# Patient Record
Sex: FEMALE | Race: WHITE | URBAN - METROPOLITAN AREA
[De-identification: names, ages, dates, MRNs, and addresses within clinical notes are randomized per-mention and may not be internally consistent; named-entity substitution may affect disease eponyms.]

---

## 2021-04-30 ENCOUNTER — OFFICE VISIT (OUTPATIENT)
Dept: ORTHOPEDIC SURGERY | Age: 56
End: 2021-04-30
Payer: COMMERCIAL

## 2021-04-30 VITALS — BODY MASS INDEX: 37.56 KG/M2 | HEIGHT: 64 IN | WEIGHT: 220 LBS

## 2021-04-30 DIAGNOSIS — M70.62 TROCHANTERIC BURSITIS OF LEFT HIP: ICD-10-CM

## 2021-04-30 DIAGNOSIS — M25.511 RIGHT SHOULDER PAIN, UNSPECIFIED CHRONICITY: ICD-10-CM

## 2021-04-30 DIAGNOSIS — M25.552 LEFT HIP PAIN: Primary | ICD-10-CM

## 2021-04-30 DIAGNOSIS — M75.51 BURSITIS OF RIGHT SHOULDER: ICD-10-CM

## 2021-04-30 PROCEDURE — 20611 DRAIN/INJ JOINT/BURSA W/US: CPT | Performed by: ORTHOPAEDIC SURGERY

## 2021-04-30 PROCEDURE — 99214 OFFICE O/P EST MOD 30 MIN: CPT | Performed by: ORTHOPAEDIC SURGERY

## 2021-04-30 RX ORDER — DESLORATADINE 5 MG/1
TABLET ORAL
COMMUNITY

## 2021-04-30 RX ORDER — DULOXETIN HYDROCHLORIDE 30 MG/1
30 CAPSULE, DELAYED RELEASE ORAL DAILY
COMMUNITY
Start: 2021-03-25

## 2021-04-30 RX ORDER — FLUTICASONE PROPIONATE 50 MCG
SPRAY, SUSPENSION (ML) NASAL
COMMUNITY

## 2021-04-30 RX ORDER — CETIRIZINE HCL 10 MG
10 TABLET ORAL DAILY
COMMUNITY

## 2021-04-30 RX ORDER — RIZATRIPTAN BENZOATE 10 MG/1
TABLET ORAL
COMMUNITY

## 2021-04-30 RX ORDER — LANOLIN ALCOHOL/MO/W.PET/CERES
3 CREAM (GRAM) TOPICAL
COMMUNITY

## 2021-04-30 RX ORDER — OXCARBAZEPINE 300 MG/1
300 TABLET, FILM COATED ORAL 2 TIMES DAILY
COMMUNITY

## 2021-04-30 RX ORDER — MOMETASONE FUROATE 50 UG/1
2 SPRAY, METERED NASAL DAILY
COMMUNITY

## 2021-04-30 RX ORDER — ZONISAMIDE 100 MG/1
CAPSULE ORAL
COMMUNITY
Start: 2020-12-02

## 2021-04-30 RX ORDER — MONTELUKAST SODIUM 10 MG/1
10 TABLET ORAL
COMMUNITY

## 2021-04-30 RX ORDER — LIDOCAINE HYDROCHLORIDE 10 MG/ML
9 INJECTION INFILTRATION; PERINEURAL ONCE
Status: COMPLETED | OUTPATIENT
Start: 2021-04-30 | End: 2021-04-30

## 2021-04-30 RX ORDER — DULOXETIN HYDROCHLORIDE 60 MG/1
60 CAPSULE, DELAYED RELEASE ORAL DAILY
COMMUNITY
Start: 2021-03-25 | End: 2022-03-25

## 2021-04-30 RX ORDER — ZOLPIDEM TARTRATE 5 MG/1
TABLET ORAL
COMMUNITY

## 2021-04-30 RX ORDER — ATORVASTATIN CALCIUM 20 MG/1
20 TABLET, FILM COATED ORAL DAILY
COMMUNITY

## 2021-04-30 RX ORDER — TRIAMCINOLONE ACETONIDE 40 MG/ML
40 INJECTION, SUSPENSION INTRA-ARTICULAR; INTRAMUSCULAR ONCE
Status: COMPLETED | OUTPATIENT
Start: 2021-04-30 | End: 2021-04-30

## 2021-04-30 RX ORDER — PROPRANOLOL HYDROCHLORIDE 160 MG/1
160 CAPSULE, EXTENDED RELEASE ORAL DAILY
COMMUNITY
Start: 2021-03-25

## 2021-04-30 RX ORDER — GALCANEZUMAB 120 MG/ML
120 INJECTION, SOLUTION SUBCUTANEOUS
COMMUNITY
Start: 2021-03-25

## 2021-04-30 RX ADMIN — TRIAMCINOLONE ACETONIDE 40 MG: 40 INJECTION, SUSPENSION INTRA-ARTICULAR; INTRAMUSCULAR at 15:19

## 2021-04-30 RX ADMIN — TRIAMCINOLONE ACETONIDE 40 MG: 40 INJECTION, SUSPENSION INTRA-ARTICULAR; INTRAMUSCULAR at 15:21

## 2021-04-30 RX ADMIN — LIDOCAINE HYDROCHLORIDE 9 ML: 10 INJECTION INFILTRATION; PERINEURAL at 15:21

## 2021-04-30 RX ADMIN — LIDOCAINE HYDROCHLORIDE 9 ML: 10 INJECTION INFILTRATION; PERINEURAL at 15:18

## 2021-04-30 NOTE — PATIENT INSTRUCTIONS
Hip Pain: Care Instructions Your Care Instructions Hip pain may be caused by many things, including overuse, a fall, or a twisting movement. Another cause of hip pain is arthritis. Your pain may increase when you stand up, walk, or squat. The pain may come and go or may be constant. Home treatment can help relieve hip pain, swelling, and stiffness. If your pain is ongoing, you may need more tests and treatment. Follow-up care is a key part of your treatment and safety. Be sure to make and go to all appointments, and call your doctor if you are having problems. It's also a good idea to know your test results and keep a list of the medicines you take. How can you care for yourself at home? · Take pain medicines exactly as directed. ? If the doctor gave you a prescription medicine for pain, take it as prescribed. ? If you are not taking a prescription pain medicine, ask your doctor if you can take an over-the-counter medicine. · Rest and protect your hip. Take a break from any activity, including standing or walking, that may cause pain. · Put ice or a cold pack against your hip for 10 to 20 minutes at a time. Try to do this every 1 to 2 hours for the next 3 days (when you are awake) or until the swelling goes down. Put a thin cloth between the ice and your skin. · Sleep on your healthy side with a pillow between your knees, or sleep on your back with pillows under your knees. · If there is no swelling, you can put moist heat, a heating pad, or a warm cloth on your hip. Do gentle stretching exercises to help keep your hip flexible. · Learn how to prevent falls. Have your vision and hearing checked regularly. Wear slippers or shoes with a nonskid sole. · Stay at a healthy weight. · Wear comfortable shoes. When should you call for help? Call 911 anytime you think you may need emergency care.  For example, call if: 
  · You have sudden chest pain and shortness of breath, or you cough up blood.  
  · You are not able to stand or walk or bear weight.  
  · Your buttocks, legs, or feet feel numb or tingly.  
  · Your leg or foot is cool or pale or changes color.  
  · You have severe pain. Call your doctor now or seek immediate medical care if: 
  · You have signs of infection, such as: 
? Increased pain, swelling, warmth, or redness in the hip area. ? Red streaks leading from the hip area. ? Pus draining from the hip area. ? A fever.  
  · You have signs of a blood clot, such as: 
? Pain in your calf, back of the knee, thigh, or groin. ? Redness and swelling in your leg or groin.  
  · You are not able to bend, straighten, or move your leg normally.  
  · You have trouble urinating or having bowel movements. Watch closely for changes in your health, and be sure to contact your doctor if: 
  · You do not get better as expected. Where can you learn more? Go to http://www.gray.com/ Enter T675 in the search box to learn more about \"Hip Pain: Care Instructions. \" Current as of: February 26, 2020               Content Version: 12.8 © 8482-9061 [employer]. Care instructions adapted under license by [employer] (which disclaims liability or warranty for this information). If you have questions about a medical condition or this instruction, always ask your healthcare professional. [place] [address] any warranty or liability for your use of this information.

## 2021-04-30 NOTE — LETTER
Scott Hagen  
1965  
[id number]  
 
 
4/30/2021 I hereby authorize and direct Jim Long MD, Debbi Shepherd, and whomever he may designate as his associate to perform upon myself the following procedure: 
 
Injection of: Kenalog, Supartz, Euflexxa, Orthovisc in the Right/Left ____________________. If any unforeseen condition arises in the course of the procedure, I further authorize him and his associated and/or assistant(s) to do whatever he/she deems advisable. The nature, purpose, benefits, risks, side effects, likelihood of achieving goals, and potential problems that might occur during recuperation, risks for not receiving the proposed care, treatment and services and alternatives of the procedure have been fully explained to me by my physician including, but not limited to: 
 
Swelling, joint pain, skin pigment changes, worsening of condition, and failure to improve. I acknowledge that no guarantee or assurance has been made to me as to the results that may be obtained or the likelihood of success. _______________________________________ Signature of patient or authorized representative [employer] [facility] fax: [phone]

## 2021-04-30 NOTE — PROGRESS NOTES
Name: Izaiah Sepulveda    : 1965     Service Dept: [address] [facility]    Patient's Pharmacies:    [place], [place] - [address]  [address]  [address] [facility] [address]  Phone: [phone] Fax: [phone]       Chief Complaint   Patient presents with    Shoulder Pain    Hip Pain        Visit Vitals  Ht 5' 4\" (1.626 m)   Wt 220 lb (99.8 kg)   BMI 37.76 kg/m²      Allergies   Allergen Reactions    Cat Hair Standardized Allergenic Extract Other (comments)     Other reaction(s): Stuffy, runny nose    Mite Extract Other (comments)     Other reaction(s): Runny, stuffy nose    Codeine Other (comments)      Current Outpatient Medications   Medication Sig Dispense Refill    atorvastatin (LIPITOR) 20 mg tablet Take 20 mg by mouth daily.  cetirizine (ZYRTEC) 10 mg tablet Take 10 mg by mouth daily.  desloratadine (CLARINEX) 5 mg tablet desloratadine 5 mg tablet      DULoxetine (CYMBALTA) 30 mg capsule Take 30 mg by mouth daily.  DULoxetine (CYMBALTA) 60 mg capsule Take 60 mg by mouth daily.  fluticasone propionate (FLONASE) 50 mcg/actuation nasal spray fluticasone propionate 50 mcg/actuation nasal spray,suspension      galcanezumab-gnlm (Emgality Syringe) 120 mg/mL syrg 120 mg by SubCUTAneous route.  melatonin 3 mg tablet Take 3 mg by mouth nightly as needed.  mometasone (NASONEX) 50 mcg/actuation nasal spray 2 Sprays by Nasal route daily.  montelukast (SINGULAIR) 10 mg tablet Take 10 mg by mouth nightly.  OXcarbazepine (TRILEPTAL) 300 mg tablet Take 300 mg by mouth two (2) times a day.  propranolol LA (INDERAL LA) 160 mg capsule Take 160 mg by mouth daily.       zolpidem (AMBIEN) 5 mg tablet zolpidem 5 mg tablet      rizatriptan (MAXALT) 10 mg tablet rizatriptan 10 mg tablet      zonisamide (ZONEGRAN) 100 mg capsule Take 3 capsules by mouth at bedtime        There is no problem list on file for this patient. Family History   Problem Relation Age of Onset    Heart Disease Father       Social History     Socioeconomic History    Marital status:      Spouse name: Not on file    Number of children: Not on file    Years of education: Not on file    Highest education level: Not on file   Tobacco Use    Smoking status: Never Smoker    Smokeless tobacco: Never Used   Substance and Sexual Activity    Alcohol use: Yes      Past Surgical History:   Procedure Laterality Date    HX WISDOM TEETH EXTRACTION        Past Medical History:   Diagnosis Date    High cholesterol     Hypertension         I have reviewed and agree with [address] [place] [address] Nw and ROS and intake form in chart and the record furthermore I have reviewed prior medical record(s) regarding this patients care during this appointment. Review of Systems:   Patient is a pleasant appearing individual, appropriately dressed, well hydrated, well nourished, who is alert, appropriately oriented for age, and in no acute distress with a normal gait and normal affect who does not appear to be in any significant pain. Physical Exam:  Left Hip - Slight decrease range of motion, No crepitation, Grossly neurovascularly intact, Good cap refill, No skin lesion, mild swelling, No gross instability, Some weakness, No groin pain, Point tenderness on the greater trochanter. Right Hip - Normal range of motion, No crepitation, Grossly neurovascularly intact, Good cap refill, No skin lesion, mild swelling, No gross instability, No weakness, No groin pain, No point tenderness on the greater trochanter. Procedure Documentation:    I discussed in detail the risks, benefits and complications of an injection which included but are not limited to infection, skin reactions, hot swollen joint, and anaphylaxis with the patient. The patient verbalized understanding and gave informed consent for the injection.  The patient's left hip was prepped using sterile alcohol solution. A sterile needle was inserted into the left hip and the mixture of 9 mL Lidocaine 1%, 1 mL Kenalog 40 mg was injected under sterile technique. The needle was withdrawn and the puncture site sealed with a Band-Aid. Technique: Under sterile conditions a sofatutor ultrasound unit with a variable frequency (7.0-14.0 MHz) linear transducer was used to localize the placement of needle into the left hip joint. Findings: Successful needle placement for hip injection. Final images were taken and saved for permanent record. The patient tolerated the injection well. The patient was instructed to call the office immediately if there is any pain, redness, warmth, fever, or chills. Right Shoulder - Grossly neurovascularly intact. Range of motion-Full passive, Active with impingement. No Point tenderness, Strength-weakness with abduction, some mild crepitation, No skin lesion are identified, No instabilty is noted, No apprehension. No Swelling. Left Shoulder - Grossly neurovascularly intact, Full Range of motion, No point tenderness, No weakness, No skin lesions, No Instability, No apprehension, No swelling. Procedure Documentation:    I discussed in detail the risks, benefits and complications of an injection which included but are not limited to infection, skin reactions, hot swollen joint, and anaphylaxis with the patient. The patient verbalized understanding and gave informed consent for the injection. The patient's right shoulder were prepped using sterile alcohol solution. A sterile needle was inserted into the right shoulder and the mixture of 9 mL Lidocaine 1%, 1 mL Kenalog 40 mg was injected under sterile technique. The needle was withdrawn and the puncture site sealed with a Band-Aid. Technique: Under sterile conditions a sofatutor ultrasound unit with a variable frequency (7.0-14.0 MHz) linear transducer was used to localize the placement of needle into the right joint.     Findings: Successful needle placement for shoulder injection. Final images were taken and saved for permanent record. The patient tolerated the injection well. The patient was instructed to call the office immediately if there is any pain, redness, warmth, fever, or chills. Encounter Diagnoses     ICD-10-CM ICD-9-CM   1. Left hip pain  M25.552 719.45   2. Trochanteric bursitis of left hip  M70.62 726.5       HPI:  The patient is here with a chief complaint of left hip pain, right shoulder pain, dull throbbing pain, progressively getting worse. Pain is 5/10. ROS:  10-point review of systems is positive for nighttime pain, night sweats. X-rays of the right shoulder and left hip are unremarkable. Assessment/Plan:  1. Left hip bursitis and right shoulder bursitis. Plan will be for cortisone injection for both. We will see the patient back for routine follow-up in one week to see how she is doing and go from there. If no better we will consider physical therapy. As part of continued conservative pain management options the patient was advised to utilize Tylenol or OTC NSAIDS as long as it is not medically contraindicated. Return to Office: Follow-up and Dispositions    · Return in about 1 week (around 5/7/2021). Administrations This Visit     lidocaine (XYLOCAINE) 10 mg/mL (1 %) injection 9 mL     Admin Date  04/30/2021 Action  Given Dose  9 mL Route  Other Administered By  Nelda Early LPN          triamcinolone acetonide (KENALOG-40) 40 mg/mL injection 40 mg     Admin Date  04/30/2021 Action  Given Dose  40 mg Route  Intra artICUlar Administered By  Nelda Early LPN               Scribed by Iesha Meyers LPN as dictated by [facility] JANINE Varela MD.  Documentation True and Accepted Jim Varela MD

## 2021-05-07 ENCOUNTER — OFFICE VISIT (OUTPATIENT)
Dept: ORTHOPEDIC SURGERY | Age: 56
End: 2021-05-07
Payer: COMMERCIAL

## 2021-05-07 DIAGNOSIS — M70.62 TROCHANTERIC BURSITIS OF LEFT HIP: Primary | ICD-10-CM

## 2021-05-07 DIAGNOSIS — M75.51 BURSITIS OF RIGHT SHOULDER: ICD-10-CM

## 2021-05-07 PROCEDURE — 99213 OFFICE O/P EST LOW 20 MIN: CPT | Performed by: ORTHOPAEDIC SURGERY

## 2021-05-07 NOTE — PATIENT INSTRUCTIONS
Hip Pain: Care Instructions  Your Care Instructions     Hip pain may be caused by many things, including overuse, a fall, or a twisting movement. Another cause of hip pain is arthritis. Your pain may increase when you stand up, walk, or squat. The pain may come and go or may be constant. Home treatment can help relieve hip pain, swelling, and stiffness. If your pain is ongoing, you may need more tests and treatment. Follow-up care is a key part of your treatment and safety. Be sure to make and go to all appointments, and call your doctor if you are having problems. It's also a good idea to know your test results and keep a list of the medicines you take. How can you care for yourself at home? · Take pain medicines exactly as directed. ? If the doctor gave you a prescription medicine for pain, take it as prescribed. ? If you are not taking a prescription pain medicine, ask your doctor if you can take an over-the-counter medicine. · Rest and protect your hip. Take a break from any activity, including standing or walking, that may cause pain. · Put ice or a cold pack against your hip for 10 to 20 minutes at a time. Try to do this every 1 to 2 hours for the next 3 days (when you are awake) or until the swelling goes down. Put a thin cloth between the ice and your skin. · Sleep on your healthy side with a pillow between your knees, or sleep on your back with pillows under your knees. · If there is no swelling, you can put moist heat, a heating pad, or a warm cloth on your hip. Do gentle stretching exercises to help keep your hip flexible. · Learn how to prevent falls. Have your vision and hearing checked regularly. Wear slippers or shoes with a nonskid sole. · Stay at a healthy weight. · Wear comfortable shoes. When should you call for help? Call 911 anytime you think you may need emergency care.  For example, call if:    · You have sudden chest pain and shortness of breath, or you cough up blood.     · You are not able to stand or walk or bear weight.     · Your buttocks, legs, or feet feel numb or tingly.     · Your leg or foot is cool or pale or changes color.     · You have severe pain. Call your doctor now or seek immediate medical care if:    · You have signs of infection, such as:  ? Increased pain, swelling, warmth, or redness in the hip area. ? Red streaks leading from the hip area. ? Pus draining from the hip area. ? A fever.     · You have signs of a blood clot, such as:  ? Pain in your calf, back of the knee, thigh, or groin. ? Redness and swelling in your leg or groin.     · You are not able to bend, straighten, or move your leg normally.     · You have trouble urinating or having bowel movements. Watch closely for changes in your health, and be sure to contact your doctor if:    · You do not get better as expected. Where can you learn more? Go to http://www.gray.com/  Enter Z720 in the search box to learn more about \"Hip Pain: Care Instructions. \"  Current as of: February 26, 2020               Content Version: 12.8  © 5836-0143 [employer]. Care instructions adapted under license by [employer] (which disclaims liability or warranty for this information). If you have questions about a medical condition or this instruction, always ask your healthcare professional. [place] [address] any warranty or liability for your use of this information. Shoulder Pain: Care Instructions  Your Care Instructions     You can hurt your shoulder by using it too much during an activity, such as fishing or baseball. It can also happen as part of the everyday wear and tear of getting older. Shoulder injuries can be slow to heal, but your shoulder should get better with time. Your doctor may recommend a sling to rest your shoulder. If you have injured your shoulder, you may need testing and treatment.   Follow-up care is a key part of your treatment and safety. Be sure to make and go to all appointments, and call your doctor if you are having problems. It's also a good idea to know your test results and keep a list of the medicines you take. How can you care for yourself at home? · Take pain medicines exactly as directed. ? If the doctor gave you a prescription medicine for pain, take it as prescribed. ? If you are not taking a prescription pain medicine, ask your doctor if you can take an over-the-counter medicine. ? Do not take two or more pain medicines at the same time unless the doctor told you to. Many pain medicines contain acetaminophen, which is Tylenol. Too much acetaminophen (Tylenol) can be harmful. · If your doctor recommends that you wear a sling, use it as directed. Do not take it off before your doctor tells you to. · Put ice or a cold pack on the sore area for 10 to 20 minutes at a time. Put a thin cloth between the ice and your skin. · If there is no swelling, you can put moist heat, a heating pad, or a warm cloth on your shoulder. Some doctors suggest alternating between hot and cold. · Rest your shoulder for a few days. If your doctor recommends it, you can then begin gentle exercise of the shoulder, but do not lift anything heavy. When should you call for help? Call 911 anytime you think you may need emergency care. For example, call if:    · You have chest pain or pressure. This may occur with:  ? Sweating. ? Shortness of breath. ? Nausea or vomiting. ? Pain that spreads from the chest to the neck, jaw, or one or both shoulders or arms. ? Dizziness or lightheadedness. ? A fast or uneven pulse. After calling 911, chew 1 adult-strength aspirin. Wait for an ambulance. Do not try to drive yourself.     · Your arm or hand is cool or pale or changes color.    Call your doctor now or seek immediate medical care if:    · You have signs of infection, such as:  ? Increased pain, swelling, warmth, or redness in your shoulder. ? Red streaks leading from a place on your shoulder. ? Pus draining from an area of your shoulder. ? Swollen lymph nodes in your neck, armpits, or groin. ? A fever. Watch closely for changes in your health, and be sure to contact your doctor if:    · You cannot use your shoulder.     · Your shoulder does not get better as expected. Where can you learn more? Go to http://www.ibarra.com/  Enter H996 in the search box to learn more about \"Shoulder Pain: Care Instructions. \"  Current as of: November 16, 2020               Content Version: 12.8  © 7101-0661 [employer]. Care instructions adapted under license by [employer] (which disclaims liability or warranty for this information). If you have questions about a medical condition or this instruction, always ask your healthcare professional. [address] any warranty or liability for your use of this information.

## 2021-05-07 NOTE — PROGRESS NOTES
Name: Negro Santana    : 1965     Service Dept: [address] [facility]    Patient's Pharmacies:    [address],  [address] [address]  Phone: [phone] Fax: [phone]       Chief Complaint   Patient presents with    Shoulder Pain    Hip Pain        There were no vitals taken for this visit. Allergies   Allergen Reactions    Cat Hair Standardized Allergenic Extract Other (comments)     Other reaction(s): Stuffy, runny nose    Mite Extract Other (comments)     Other reaction(s): Runny, stuffy nose    Codeine Other (comments)      Current Outpatient Medications   Medication Sig Dispense Refill    atorvastatin (LIPITOR) 20 mg tablet Take 20 mg by mouth daily.  cetirizine (ZYRTEC) 10 mg tablet Take 10 mg by mouth daily.  desloratadine (CLARINEX) 5 mg tablet desloratadine 5 mg tablet      DULoxetine (CYMBALTA) 30 mg capsule Take 30 mg by mouth daily.  DULoxetine (CYMBALTA) 60 mg capsule Take 60 mg by mouth daily.  fluticasone propionate (FLONASE) 50 mcg/actuation nasal spray fluticasone propionate 50 mcg/actuation nasal spray,suspension      galcanezumab-gnlm (Emgality Syringe) 120 mg/mL syrg 120 mg by SubCUTAneous route.  melatonin 3 mg tablet Take 3 mg by mouth nightly as needed.  mometasone (NASONEX) 50 mcg/actuation nasal spray 2 Sprays by Nasal route daily.  montelukast (SINGULAIR) 10 mg tablet Take 10 mg by mouth nightly.  OXcarbazepine (TRILEPTAL) 300 mg tablet Take 300 mg by mouth two (2) times a day.  propranolol LA (INDERAL LA) 160 mg capsule Take 160 mg by mouth daily.  zolpidem (AMBIEN) 5 mg tablet zolpidem 5 mg tablet      rizatriptan (MAXALT) 10 mg tablet rizatriptan 10 mg tablet      zonisamide (ZONEGRAN) 100 mg capsule Take 3 capsules by mouth at bedtime        There is no problem list on file for this patient.      Family History   Problem Relation Age of Onset    Heart Disease Father       Social History     Socioeconomic History    Marital status:      Spouse name: Not on file    Number of children: Not on file    Years of education: Not on file    Highest education level: Not on file   Tobacco Use    Smoking status: Never Smoker    Smokeless tobacco: Never Used   Substance and Sexual Activity    Alcohol use: Yes      Past Surgical History:   Procedure Laterality Date    HX WISDOM TEETH EXTRACTION        Past Medical History:   Diagnosis Date    High cholesterol     Hypertension         I have reviewed and agree with PFS and ROS and intake form in chart and the record furthermore I have reviewed prior medical record(s) regarding this patients care during this appointment. Review of Systems:   Patient is a pleasant appearing individual, appropriately dressed, well hydrated, well nourished, who is alert, appropriately oriented for age, and in no acute distress with a normal gait and normal affect who does not appear to be in any significant pain. Physical Exam:  Left hip - Normal range of motion, No crepitation, Grossly neurovascularly intact, Good cap refill, No skin lesion, mild swelling, No gross instability, No weakness, No groin pain, No point tenderness on the greater trochanter. Right hip - Normal range of motion, No crepitation, Grossly neurovascularly intact, Good cap refill, No skin lesion, mild swelling, No gross instability, No weakness, No groin pain, No point tenderness on the greater trochanter. Right Shoulder - grossly neurovascularly intact. Full Range of motion, No Weakness with abduction, No Point Tenderness, No skin lesion are identified, No instabilty is noted, No apprehension. No cuts or abrasions are identified. Left Shoulder - grossly neurovascularly intact.  Full Range of motion, No Weakness with abduction, No Point Tenderness, No skin lesion are identified, No instabilty is noted, No apprehension. No cuts or abrasions are identified. Encounter Diagnoses     ICD-10-CM ICD-9-CM   1. Trochanteric bursitis of left hip  M70.62 726.5   2. Bursitis of right shoulder  M75.51 726.10       HPI:  The patient is here with a chief complaint of right shoulder pain, left hip pain, dull, throbbing pain. Pain is 0/10. Post injection, feeling significantly better. Assessment/Plan:  Plan at this point, activities as tolerated, weightbearing started, no restrictions. We will see her back as needed. As part of continued conservative pain management options the patient was advised to utilize Tylenol or OTC NSAIDS as long as it is not medically contraindicated. Return to Office: Follow-up and Dispositions    · Return if symptoms worsen or fail to improve. Scribed by Cierra Cantrell LPN as dictated by [facility] JANINE Smiley MD.  Documentation True and Accepted Jim JANINE Smiley MD

## 2021-05-10 RX ORDER — LOSARTAN POTASSIUM 50 MG/1
TABLET ORAL
COMMUNITY
Start: 2021-04-25

## 2023-02-28 ENCOUNTER — OFFICE VISIT (OUTPATIENT)
Age: 58
End: 2023-02-28

## 2023-02-28 VITALS — HEIGHT: 64 IN | BODY MASS INDEX: 37.56 KG/M2 | WEIGHT: 220 LBS

## 2023-02-28 DIAGNOSIS — M75.52 BILATERAL SHOULDER BURSITIS: Primary | ICD-10-CM

## 2023-02-28 DIAGNOSIS — M25.512 BILATERAL SHOULDER PAIN, UNSPECIFIED CHRONICITY: ICD-10-CM

## 2023-02-28 DIAGNOSIS — M75.51 BILATERAL SHOULDER BURSITIS: Primary | ICD-10-CM

## 2023-02-28 DIAGNOSIS — M25.511 BILATERAL SHOULDER PAIN, UNSPECIFIED CHRONICITY: ICD-10-CM

## 2023-02-28 RX ORDER — HYDROCODONE BITARTRATE AND ACETAMINOPHEN 5; 300 MG/1; MG/1
TABLET ORAL
COMMUNITY

## 2023-02-28 RX ORDER — BETAMETHASONE VALERATE 0.1 %
LOTION (ML) TOPICAL
COMMUNITY

## 2023-02-28 RX ORDER — PSYLLIUM HUSK (WITH SUGAR) 3.4 G/7 G
1 POWDER (GRAM) ORAL DAILY
COMMUNITY
Start: 2016-04-20

## 2023-02-28 RX ORDER — LIDOCAINE HYDROCHLORIDE 10 MG/ML
9 INJECTION, SOLUTION INFILTRATION; PERINEURAL ONCE
Status: COMPLETED | OUTPATIENT
Start: 2023-02-28 | End: 2023-02-28

## 2023-02-28 RX ORDER — ERENUMAB-AOOE 140 MG/ML
INJECTION, SOLUTION SUBCUTANEOUS
COMMUNITY
Start: 2020-02-23

## 2023-02-28 RX ORDER — TRIAMCINOLONE ACETONIDE 40 MG/ML
40 INJECTION, SUSPENSION INTRA-ARTICULAR; INTRAMUSCULAR ONCE
Status: COMPLETED | OUTPATIENT
Start: 2023-02-28 | End: 2023-02-28

## 2023-02-28 RX ORDER — BENZONATATE 100 MG/1
CAPSULE ORAL
COMMUNITY

## 2023-02-28 RX ORDER — DULOXETIN HYDROCHLORIDE 60 MG/1
CAPSULE, DELAYED RELEASE ORAL
COMMUNITY
Start: 2022-12-13

## 2023-02-28 RX ORDER — GABAPENTIN 600 MG/1
TABLET ORAL
COMMUNITY

## 2023-02-28 RX ADMIN — TRIAMCINOLONE ACETONIDE 40 MG: 40 INJECTION, SUSPENSION INTRA-ARTICULAR; INTRAMUSCULAR at 15:13

## 2023-02-28 RX ADMIN — LIDOCAINE HYDROCHLORIDE 9 ML: 10 INJECTION, SOLUTION INFILTRATION; PERINEURAL at 15:13

## 2023-02-28 NOTE — PATIENT INSTRUCTIONS
Shoulder Bursitis: Care Instructions  Overview     Bursitis is inflammation of the bursa. A bursa is a small sac of fluid that cushions a joint and helps it move easily. A bursa sits under the highest point of your shoulder. You can get bursitis by overusing your shoulder, which can happen with activities such as lifting, pitching a ball, or painting. Symptoms of bursitis include pain when you move your arm. Your arm may hurt when you try to lift it, and the pain can reach down the side of your arm. You may have trouble sleeping because of the pain. Bursitis usually gets better if you avoid the activity that caused it. If pain lasts or gets worse despite home treatment, your doctor may draw fluid from the bursa through a needle. This may relieve your pain and help your doctor know if you have an infection. If so, your doctor will prescribe antibiotics. If you have inflammation only, you may get a corticosteroid shot to reduce swelling and pain. Sometimes surgery is needed to drain or remove the bursa. Follow-up care is a key part of your treatment and safety. Be sure to make and go to all appointments, and call your doctor if you are having problems. It's also a good idea to know your test results and keep a list of the medicines you take. How can you care for yourself at home? Put ice or a cold pack on your shoulder for 10 to 20 minutes at a time. Put a thin cloth between the ice and your skin. After 3 days of using ice, use heat on your shoulder. You can use a hot water bottle, a heating pad set on low, or a warm, moist towel. Some doctors suggest alternating between hot and cold. Rest your shoulder. Stop any activities that cause pain. Switch to activities that do not stress your shoulder. Take your medicines exactly as prescribed. Call your doctor if you think you are having a problem with your medicine. If your doctor recommends it, take anti-inflammatory medicines to reduce pain.  These include ibuprofen (Advil, Motrin) and naproxen (Aleve). Read and follow all instructions on the label. To prevent stiffness, gently move the shoulder joint through its full range of motion. As the pain gets better, keep doing range-of-motion exercises. Ask your doctor for exercises that will make the muscles around the shoulder joint stronger. Do these as directed. You can slowly return to the activity that caused the pain, but do it with less effort until you can do it without pain or swelling. Be sure to warm up before and stretch after you do the activity. When should you call for help? Call your doctor now or seek immediate medical care if:    You have a fever. You have increased swelling or redness in your shoulder. You cannot use your shoulder, or the pain in your shoulder gets worse. Watch closely for changes in your health, and be sure to contact your doctor if:    You have pain for 2 weeks or longer despite home treatment. Where can you learn more? Go to http://www.woods.com/ and enter M955 to learn more about \"Shoulder Bursitis: Care Instructions. \"  Current as of: March 9, 2022               Content Version: 13.5  © 8407-2345 [employer]. Care instructions adapted under license by [facility] ([facility]). If you have questions about a medical condition or this instruction, always ask your healthcare professional. [address] any warranty or liability for your use of this information.

## 2023-02-28 NOTE — LETTER
Santo Bray   1965   [id number]       2/28/2023       I hereby authorize and direct John Dooley MD, Mayo Davis, and whomever he may designate as his associate to perform upon myself the following procedure:    Injection of: Kenalog, bl shoulder rm 6    If any unforeseen condition arises in the course of the procedure, I further authorize him and his associated and/or assistant(s) to do whatever he/she deems advisable. The nature, purpose, benefits, risks, side effects, likelihood of achieving goals, and potential problems that might occur during recuperation, risks for not receiving the proposed care, treatment and services and alternatives of the procedure have been fully explained to me by my physician including, but not limited to:    Swelling, joint pain, skin pigment changes, worsening of condition, and failure to improve. I acknowledge that no guarantee or assurance has been made to me as to the results that may be obtained or the likelihood of success.                 _______________________________________     Signature of patient or authorized representative                [employer] [facility] fax: [phone]

## 2023-02-28 NOTE — PROGRESS NOTES
Name: Chely Taveras    : 1965     [facility]  [address]  [address], [facility] [address] [facility] [address]  Dept: [phone]  Dept Fax: [phone]     Chief Complaint   Patient presents with    Shoulder Pain    Hip Pain        Ht 5' 4\" (1.626 m)   Wt 220 lb (99.8 kg)   BMI 37.76 kg/m²      Allergies   Allergen Reactions    Cat Hair Extract Other (See Comments)     Other reaction(s): Stuffy, runny nose  Other reaction(s): Stuffy, runny nose    Dust Mite Extract Other (See Comments)     Other reaction(s): Runny, stuffy nose    Adhesive Tape     Codeine Other (See Comments)     Other reaction(s): gi distress, GI Intolerance    Wound Dressings Dermatitis, Itching and Rash        Current Outpatient Medications   Medication Sig Dispense Refill    Erenumab-aooe (AIMOVIG) 140 MG/ML SOAJ INJECT 1 PRE FILLED PEN SYRINGE UNDER THE SKIN EVERY 30 (THIRTY) DAYS      Erenumab-aooe 70 MG/ML SOAJ INJECT 2 PENS BENEATH THE SKIN ONCE MONTH AS DIRECTED      [employer] Fiber Gummies 2 g CHEW Take 1 tablet by mouth daily      Inulin 2 g CHEW Take 1 tablet by mouth      benzonatate (TESSALON) 100 MG capsule benzonatate 100 mg capsule      betamethasone valerate (VALISONE) 0.1 % lotion betamethasone valerate 0.1 % lotion      CALCIUM PO       Cholecalciferol (VITAMIN D3) 125 MCG (5000 UT) CAPS       diclofenac (VOLTAREN) 50 MG EC tablet       DULoxetine (CYMBALTA) 60 MG extended release capsule       gabapentin (NEURONTIN) 600 MG tablet gabapentin 600 mg tablet      HYDROcodone-acetaminophen 5-300 MG TABS hydrocodone 5 mg-acetaminophen 300 mg tablet      atorvastatin (LIPITOR) 20 MG tablet Take 20 mg by mouth daily      cetirizine (ZYRTEC) 10 MG tablet Take 10 mg by mouth daily      desloratadine (CLARINEX) 5 MG tablet desloratadine 5 mg tablet      fluticasone (FLONASE) 50 MCG/ACT nasal spray fluticasone propionate 50 mcg/actuation nasal spray,suspension Galcanezumab-gnlm (EMGALITY) 120 MG/ML SOSY Inject 120 mg into the skin      losartan (COZAAR) 50 MG tablet ceived the following from [facility]: Outside name: losartan (COZAAR) 50 mg tablet      melatonin 3 MG TABS tablet Take 3 mg by mouth      mometasone (NASONEX) 50 MCG/ACT nasal spray 2 sprays by Nasal route daily      propranolol (INDERAL LA) 160 MG extended release capsule Take 160 mg by mouth daily      zonisamide (ZONEGRAN) 100 MG capsule Take 3 capsules by mouth at bedtime       No current facility-administered medications for this visit. There is no problem list on file for this patient. Family History   Problem Relation Age of Onset    Heart Disease Father       Social History     Socioeconomic History    Marital status:      Spouse name: None    Number of children: None    Years of education: None    Highest education level: None   Tobacco Use    Smoking status: Never    Smokeless tobacco: Never   Substance and Sexual Activity    Alcohol use: Yes      Past Surgical History:   Procedure Laterality Date    WISDOM TOOTH EXTRACTION        Past Medical History:   Diagnosis Date    High cholesterol     Hypertension         I have reviewed and agree with [facility] Nay and intake form in chart and the record furthermore I have reviewed prior medical record(s) regarding this patients care during this appointment. Review of Systems:   Patient is a pleasant appearing individual, appropriately dressed, well hydrated, well nourished, who is alert, appropriately oriented for age, and in no acute distress with a normal gait and normal affect who does not appear to be in any significant pain. Physical Exam:  Right Shoulder - Grossly neurovascularly intact. Range of motion-Full passive, Active with impingement. No Point tenderness, Strength-weakness with abduction, some mild crepitation, No skin lesion are identified, No instabilty is noted, No apprehension. No Swelling. Left Shoulder - Grossly neurovascularly intact. Range of motion-Full passive, Active with impingement. No Point tenderness, Strength-weakness with abduction, some mild crepitation, No skin lesion are identified, No instabilty is noted, No apprehension. No Swelling. Procedure Documentation:    I discussed in detail the risks, benefits and complications of an injection which included but are not limited to infection, skin reactions, hot swollen joint, and anaphylaxis with the patient. The patient verbalized understanding and gave informed consent for the injection. The patient's bilateral shoulder(s) were prepped using sterile alcohol solution. A sterile needle was inserted into the bilateral shoulder(s) and the mixture of 9 mL Lidocaine 1%, 1 mL Kenalog 40 mg was injected under sterile technique. The needle was withdrawn and the puncture site sealed with a Band-Aid. Technique: Under sterile conditions a docplanner ultrasound unit with a variable frequency (7.0-14.0 MHz) linear transducer was used to localize the placement of needle into the bilateral shoulder(s) joint. Findings: Successful needle placement for shoulder injection. Final images were taken and saved for permanent record. The patient tolerated the injection well. The patient was instructed to call the office immediately if there is any pain, redness, warmth, fever, or chills. Visit Diagnoses         Codes    Bilateral shoulder bursitis    -  Primary M75.51, M75.52    Bilateral shoulder pain, unspecified chronicity     M25.511, M25.512               HPI:  The patient is here with bilateral shoulder pain, throbbing burning pain, progressively getting worse. Pain is 4/10. X-rays of the bilateral shoulders in the past are unremarkable. Assessment/Plan:  1. Bilateral shoulder bursitis. Plan will be for cortisone injection. We will see her back as needed. Injections have helped her in the past.  We will go from there.       As part of continued conservative pain management options the patient was advised to utilize Tylenol or OTC NSAIDS as long as it is not medically contraindicated. Return to Office: Follow-up and Dispositions    Return if symptoms worsen or fail to improve. Administrations This Visit       lidocaine 1 % injection 9 mL       Admin Date  02/28/2023 Action  Given Dose  9 mL Route  Other Administered By  Maria Del Rosario Zamora LPN      Admin Date  02/28/2023 Action  Given Dose  9 mL Route  Other Administered By  Maria Del Rosario Zamora LPN              triamcinolone acetonide (KENALOG-40) injection 40 mg       Admin Date  02/28/2023 Action  Given Dose  40 mg Route  Other Administered By  Maria Del Rosario Zamora LPN      Admin Date  02/28/2023 Action  Given Dose  40 mg Route  Other Administered By  Maria Del Rosario Zamora LPN                   Scribed by Maria Del Rosario Zamora LPN as dictated by [facility] RAYMON Gomez MD.  Documentation, performed by, True and Accepted John Gomez MD

## 2023-03-17 ENCOUNTER — OFFICE VISIT (OUTPATIENT)
Age: 58
End: 2023-03-17
Payer: COMMERCIAL

## 2023-03-17 VITALS — HEIGHT: 64 IN | BODY MASS INDEX: 37.56 KG/M2 | WEIGHT: 220 LBS

## 2023-03-17 DIAGNOSIS — M25.552 PAIN IN LEFT HIP: Primary | ICD-10-CM

## 2023-03-17 DIAGNOSIS — M70.62 TROCHANTERIC BURSITIS, LEFT HIP: ICD-10-CM

## 2023-03-17 PROCEDURE — 99213 OFFICE O/P EST LOW 20 MIN: CPT | Performed by: ORTHOPAEDIC SURGERY

## 2023-03-17 PROCEDURE — 20611 DRAIN/INJ JOINT/BURSA W/US: CPT | Performed by: ORTHOPAEDIC SURGERY

## 2023-03-17 RX ORDER — TRIAMCINOLONE ACETONIDE 40 MG/ML
40 INJECTION, SUSPENSION INTRA-ARTICULAR; INTRAMUSCULAR ONCE
Status: COMPLETED | OUTPATIENT
Start: 2023-03-17 | End: 2023-03-17

## 2023-03-17 RX ORDER — LIDOCAINE HYDROCHLORIDE 10 MG/ML
9 INJECTION, SOLUTION INFILTRATION; PERINEURAL ONCE
Status: COMPLETED | OUTPATIENT
Start: 2023-03-17 | End: 2023-03-17

## 2023-03-17 RX ADMIN — TRIAMCINOLONE ACETONIDE 40 MG: 40 INJECTION, SUSPENSION INTRA-ARTICULAR; INTRAMUSCULAR at 13:12

## 2023-03-17 RX ADMIN — LIDOCAINE HYDROCHLORIDE 9 ML: 10 INJECTION, SOLUTION INFILTRATION; PERINEURAL at 13:12

## 2023-03-17 NOTE — PATIENT INSTRUCTIONS
Hip Bursitis: Care Instructions  Overview     Bursitis is inflammation of the bursa. A bursa is a small sac of fluid that cushions a joint and helps it move easily. A bursa sits between a bone in the hip and the muscles and tendons in the thigh and buttock. Injury or overuse of the hip can cause bursitis. Activities that can lead to bursitis include twisting and rapid joint movement. Bursitis can cause hip pain. Bursitis usually gets better if you avoid the activity that caused it. If pain lasts or gets worse despite home treatment, your doctor may draw fluid from the bursa through a needle. This may relieve your pain and help your doctor know if you have an infection. If so, your doctor will prescribe antibiotics. If you have inflammation only, you may get a corticosteroid shot to reduce swelling and pain. Sometimes surgery is needed to drain or remove the bursa. Follow-up care is a key part of your treatment and safety. Be sure to make and go to all appointments, and call your doctor if you are having problems. It's also a good idea to know your test results and keep a list of the medicines you take. How can you care for yourself at home? Put ice or a cold pack on your hip for 10 to 20 minutes at a time. Put a thin cloth between the ice and your skin. After 3 days of using ice, you may use heat on your hip. You can use a hot water bottle, a heating pad set on low, or a warm, moist towel. Rest your hip. Stop any activities that cause pain. Switch to activities that do not stress your hip. Take your medicines exactly as prescribed. Call your doctor if you think you are having a problem with your medicine. Ask your doctor if you can take an over-the-counter pain medicine, such as acetaminophen (Tylenol), ibuprofen (Advil, Motrin), or naproxen (Aleve). Be safe with medicines. Read and follow all instructions on the label.   To prevent stiffness, gently move the hip joint as much as you can without pain every

## 2023-03-17 NOTE — PROGRESS NOTES
Return if symptoms worsen or fail to improve. Administrations This Visit       lidocaine 1 % injection 9 mL       Admin Date  03/17/2023 Action  Given Dose  9 mL Route  Other Administered By  [place] Sat, LPN              triamcinolone acetonide (KENALOG-40) injection 40 mg       Admin Date  03/17/2023 Action  Given Dose  40 mg Route  Other Administered By  Michelle Sat, LPN                   Scribed by [place] Sat, LPN as dictated by [facility] RAYMON Jansen MD.  Documentation, performed by, True and Accepted John Jansen MD

## 2023-03-17 NOTE — LETTER
Suraj Truman   1965   [id number]       3/17/2023       I hereby authorize and direct John Deutsch MD, Kathleen Ramírez, and whomever he may designate as his associate to perform upon myself the following procedure:    Injection of: Kenalog, Supartz, Euflexxa, Orthovisc in the Right/Left ____________________. If any unforeseen condition arises in the course of the procedure, I further authorize him and his associated and/or assistant(s) to do whatever he/she deems advisable. The nature, purpose, benefits, risks, side effects, likelihood of achieving goals, and potential problems that might occur during recuperation, risks for not receiving the proposed care, treatment and services and alternatives of the procedure have been fully explained to me by my physician including, but not limited to:    Swelling, joint pain, skin pigment changes, worsening of condition, and failure to improve. I acknowledge that no guarantee or assurance has been made to me as to the results that may be obtained or the likelihood of success.                 _______________________________________     Signature of patient or authorized representative                [employer] [facility] fax: [phone]

## 2023-08-10 ENCOUNTER — OFFICE VISIT (OUTPATIENT)
Age: 58
End: 2023-08-10

## 2023-08-10 DIAGNOSIS — M17.12 OSTEOARTHRITIS OF LEFT KNEE, UNSPECIFIED OSTEOARTHRITIS TYPE: ICD-10-CM

## 2023-08-10 DIAGNOSIS — M25.562 LEFT KNEE PAIN, UNSPECIFIED CHRONICITY: ICD-10-CM

## 2023-08-10 DIAGNOSIS — M70.62 TROCHANTERIC BURSITIS, LEFT HIP: Primary | ICD-10-CM

## 2023-08-10 DIAGNOSIS — M25.552 PAIN IN LEFT HIP: ICD-10-CM

## 2023-08-10 RX ORDER — NARATRIPTAN 2.5 MG/1
TABLET ORAL
COMMUNITY
Start: 2023-06-22

## 2023-08-10 RX ORDER — LIDOCAINE HYDROCHLORIDE 10 MG/ML
9 INJECTION, SOLUTION INFILTRATION; PERINEURAL ONCE
Status: COMPLETED | OUTPATIENT
Start: 2023-08-10 | End: 2023-08-10

## 2023-08-10 RX ORDER — TRIAMCINOLONE ACETONIDE 40 MG/ML
40 INJECTION, SUSPENSION INTRA-ARTICULAR; INTRAMUSCULAR ONCE
Status: COMPLETED | OUTPATIENT
Start: 2023-08-10 | End: 2023-08-10

## 2023-08-10 RX ORDER — NORTRIPTYLINE HYDROCHLORIDE 25 MG/1
CAPSULE ORAL
COMMUNITY
Start: 2023-07-01

## 2023-08-10 RX ADMIN — TRIAMCINOLONE ACETONIDE 40 MG: 40 INJECTION, SUSPENSION INTRA-ARTICULAR; INTRAMUSCULAR at 15:35

## 2023-08-10 RX ADMIN — LIDOCAINE HYDROCHLORIDE 9 ML: 10 INJECTION, SOLUTION INFILTRATION; PERINEURAL at 15:35

## 2023-08-10 NOTE — PROGRESS NOTES
Name: Derek Renee    : 1965     [facility]  [facility]  [address] [facility], [address] [address]  Dept: [phone]  Dept Fax: [phone]     Chief Complaint   Patient presents with    Hip Pain        There were no vitals taken for this visit.      Allergies   Allergen Reactions    Cat Hair Extract Other (See Comments) and Rash     Other reaction(s): Stuffy, runny nose  Other reaction(s): Stuffy, runny nose    Dust Mite Extract Other (See Comments)     Other reaction(s): Runny, stuffy nose    Adhesive Tape     Codeine Other (See Comments) and Nausea Only     Other reaction(s): gi distress, GI Intolerance  Other reaction(s): gi distress, GI Intolerance, Other (comments), Other (See Comments)  Other reaction(s): gi distress, GI Intolerance      Grass Pollen(K-O-R-T-Swt Shiv) Rash    Wound Dressings Dermatitis, Itching and Rash     Other reaction(s): Dermatitis        Current Outpatient Medications   Medication Sig Dispense Refill    nortriptyline (PAMELOR) 25 MG capsule TAKE 1 CAPSULE BY MOUTH EVERYDAY AT BEDTIME      naratriptan (AMERGE) 2.5 MG tablet PLEASE SEE ATTACHED FOR DETAILED DIRECTIONS      benzonatate (TESSALON) 100 MG capsule benzonatate 100 mg capsule      betamethasone valerate (VALISONE) 0.1 % lotion betamethasone valerate 0.1 % lotion      CALCIUM PO       Cholecalciferol (VITAMIN D3) 125 MCG (5000 UT) CAPS       diclofenac (VOLTAREN) 50 MG EC tablet       DULoxetine (CYMBALTA) 60 MG extended release capsule       Erenumab-aooe (AIMOVIG) 140 MG/ML SOAJ INJECT 1 PRE FILLED PEN SYRINGE UNDER THE SKIN EVERY 30 (THIRTY) DAYS      Erenumab-aooe 70 MG/ML SOAJ INJECT 2 PENS BENEATH THE SKIN ONCE MONTH AS DIRECTED      [employer] Fiber Gummies 2 g CHEW Take 1 tablet by mouth daily      gabapentin (NEURONTIN) 600 MG tablet gabapentin 600 mg tablet      HYDROcodone-acetaminophen 5-300 MG TABS hydrocodone 5 mg-acetaminophen

## 2023-12-08 ENCOUNTER — OFFICE VISIT (OUTPATIENT)
Age: 58
End: 2023-12-08
Payer: COMMERCIAL

## 2023-12-08 DIAGNOSIS — M75.51 BURSITIS OF RIGHT SHOULDER: ICD-10-CM

## 2023-12-08 DIAGNOSIS — M25.552 LEFT HIP PAIN: ICD-10-CM

## 2023-12-08 DIAGNOSIS — M25.511 RIGHT SHOULDER PAIN, UNSPECIFIED CHRONICITY: Primary | ICD-10-CM

## 2023-12-08 DIAGNOSIS — M70.72 BURSITIS OF LEFT HIP, UNSPECIFIED BURSA: ICD-10-CM

## 2023-12-08 PROCEDURE — 99213 OFFICE O/P EST LOW 20 MIN: CPT | Performed by: ORTHOPAEDIC SURGERY

## 2023-12-08 NOTE — PROGRESS NOTES
Name: Tadeo Gonzalez    : 1965     [address] [facility]  [facility]  [address]  [address] [place] [address]   Dept: [phone]  Dept Fax: [phone]     Chief Complaint   Patient presents with    Shoulder Pain    Hip Pain        There were no vitals taken for this visit.      Allergies   Allergen Reactions    Cat Hair Extract Other (See Comments) and Rash     Other reaction(s): Stuffy, runny nose  Other reaction(s): Stuffy, runny nose    Dust Mite Extract Other (See Comments)     Other reaction(s): Runny, stuffy nose    Adhesive Tape     Codeine Other (See Comments) and Nausea Only     Other reaction(s): gi distress, GI Intolerance  Other reaction(s): gi distress, GI Intolerance, Other (comments), Other (See Comments)  Other reaction(s): gi distress, GI Intolerance      Grass Pollen(K-O-R-T-Swt Shiv) Rash    Wound Dressings Dermatitis, Itching and Rash     Other reaction(s): Dermatitis        Current Outpatient Medications   Medication Sig Dispense Refill    nortriptyline (PAMELOR) 25 MG capsule TAKE 1 CAPSULE BY MOUTH EVERYDAY AT BEDTIME      naratriptan (AMERGE) 2.5 MG tablet PLEASE SEE ATTACHED FOR DETAILED DIRECTIONS      betamethasone valerate (VALISONE) 0.1 % lotion betamethasone valerate 0.1 % lotion      CALCIUM PO       Cholecalciferol (VITAMIN D3) 125 MCG (5000 UT) CAPS       diclofenac (VOLTAREN) 50 MG EC tablet       DULoxetine (CYMBALTA) 60 MG extended release capsule       [employer] Fiber Gummies 2 g CHEW Take 1 tablet by mouth daily      atorvastatin (LIPITOR) 20 MG tablet Take 20 mg by mouth daily      cetirizine (ZYRTEC) 10 MG tablet Take 10 mg by mouth daily      desloratadine (CLARINEX) 5 MG tablet desloratadine 5 mg tablet      Galcanezumab-gnlm (EMGALITY) 120 MG/ML SOSY Inject 120 mg into the skin      losartan (COZAAR) 50 MG tablet ceived the following from [facility]: Outside name: losartan (COZAAR) 50 mg

## 2023-12-08 NOTE — PATIENT INSTRUCTIONS
infection. If so, your doctor will prescribe antibiotics. If you have inflammation only, you may get a corticosteroid shot to reduce swelling and pain. Sometimes surgery is needed to drain or remove the bursa. Follow-up care is a key part of your treatment and safety. Be sure to make and go to all appointments, and call your doctor if you are having problems. It's also a good idea to know your test results and keep a list of the medicines you take. How can you care for yourself at home? Put ice or a cold pack on your hip for 10 to 20 minutes at a time. Put a thin cloth between the ice and your skin. After 3 days of using ice, you may use heat on your hip. You can use a hot water bottle, a heating pad set on low, or a warm, moist towel. Rest your hip. Stop any activities that cause pain. Switch to activities that do not stress your hip. Take your medicines exactly as prescribed. Call your doctor if you think you are having a problem with your medicine. Ask your doctor if you can take an over-the-counter pain medicine, such as acetaminophen (Tylenol), ibuprofen (Advil, Motrin), or naproxen (Aleve). Be safe with medicines. Read and follow all instructions on the label. To prevent stiffness, gently move the hip joint as much as you can without pain every day. As the pain gets better, keep doing range-of-motion exercises. Ask your doctor for exercises that will make the muscles around the hip joint stronger. Do these as directed. You can slowly return to the activity that caused the pain, but do it with less effort until you can do it without pain or swelling. Be sure to warm up before and stretch after you do the activity. When should you call for help? Call your doctor now or seek immediate medical care if:    You have a fever. You have increased swelling or redness in your hip. You cannot use your hip, or the pain in your hip gets worse.    Watch closely for changes in your health, and be sure to

## 2023-12-21 ENCOUNTER — HOSPITAL ENCOUNTER (OUTPATIENT)
Facility: HOSPITAL | Age: 58
Setting detail: RECURRING SERIES
Discharge: HOME | End: 2023-12-24
Payer: COMMERCIAL

## 2023-12-21 PROCEDURE — 97110 THERAPEUTIC EXERCISES: CPT

## 2023-12-21 PROCEDURE — 97112 NEUROMUSCULAR REEDUCATION: CPT

## 2023-12-21 NOTE — PROGRESS NOTES
PHYSICAL / OCCUPATIONAL THERAPY - DAILY TREATMENT NOTE (updated )    Patient Name: Jam Solitario    Date: 2023    : 1965  Insurance: Payor: Jessie Torres / Plan: Ash Monroe / Product Type: *No Product type* /      Patient  verified Yes     Visit #   Current / Total 2 24   Time   In / Out [id number]   Pain   In / Out 1 1   Subjective Functional Status/Changes: Patient reports no new changes since her evaluation yesterday. She states that if her HR rises too much, she will experience a migraine. Changes to: Allergies, Med Hx, Sx Hx?   no       TREATMENT AREA =  Pain in right shoulder [M25.511]    OBJECTIVE    Modalities Rationale:     decrease pain and increase tissue extensibility to improve patient's ability to progress to PLOF and address remaining functional goals. min [] Estim Unattended, type/location:                                      []  w/ice    []  w/heat    min [] Estim Attended, type/location:                                     []  w/US     []  w/ice    []  w/heat    []  TENS insruct      min []  Mechanical Traction: type/lbs                   []  pro   []  sup   []  int   []  cont    []  before manual    []  after manual    min []  Ultrasound, settings/location:      min []  Iontophoresis w/ dexamethasone, location:                                               []  take home patch       []  in clinic     10   min  unbilled []  Ice     [x]  Heat    location/position: Right shoulder/supine     min []  Paraffin,  details:     min []  Vasopneumatic Device, press/temp:     min []  Catherine Chávez / Chris Do: If using vaso (only need to measure limb vaso being performed on)      pre-treatment girth :       post-treatment girth :       measured at (landmark location) :      min []  Other:    Skin assessment post-treatment (if applicable):    [x]  intact    [x]  redness- no adverse reaction                 []redness - adverse reaction:         Therapeutic Procedures:   Tx Min Billable

## 2023-12-28 ENCOUNTER — TELEPHONE (OUTPATIENT)
Age: 58
End: 2023-12-28

## 2023-12-28 DIAGNOSIS — M70.62 TROCHANTERIC BURSITIS, LEFT HIP: Primary | ICD-10-CM

## 2023-12-28 NOTE — TELEPHONE ENCOUNTER
Received voicemail from patient in regards to MRI and that the insurance company is stating that its being denied due to no physical therapy. We are currently still trying to work on getting the MRI approved, however, the insurance company is looking for us to provide a very specific diagnosis and as of now one has not been provided in office notes. She has rescheduled her MRI to a later time in hopes that it will be approved, however, in the mean time I am going to place a PT referral in for her hip to start some exercises. Patient called back and I explained all of this to her.

## 2024-01-03 ENCOUNTER — HOSPITAL ENCOUNTER (OUTPATIENT)
Facility: HOSPITAL | Age: 59
Setting detail: RECURRING SERIES
Discharge: HOME | End: 2024-01-06
Payer: COMMERCIAL

## 2024-01-03 PROCEDURE — 97112 NEUROMUSCULAR REEDUCATION: CPT

## 2024-01-03 PROCEDURE — 97140 MANUAL THERAPY 1/> REGIONS: CPT

## 2024-01-03 PROCEDURE — 97110 THERAPEUTIC EXERCISES: CPT

## 2024-01-03 NOTE — PROGRESS NOTES
PHYSICAL / OCCUPATIONAL THERAPY - DAILY TREATMENT NOTE (updated )    Patient Name: Serene Swanson    Date: 1/3/2024    : 1965  Insurance: Payor: DAMARI / Plan: DONNA WETZEL FEDERAL / Product Type: *No Product type* /      Patient  verified Yes     Visit #   Current / Total 3 24   Time   In / Out 12:30 1:20   Pain   In / Out 2/10 2/10   Subjective Functional Status/Changes: Pt reports shoulder was fine after last visit. She has been resting to reduce the pain.    Changes to:  Allergies, Med Hx, Sx Hx?   no       TREATMENT AREA =  Pain in right shoulder [M25.511]    OBJECTIVE    Modalities Rationale:     decrease edema, decrease inflammation, and decrease pain to improve patient's ability to progress to PLOF and address remaining functional goals.     min [] Estim Unattended, type/location:                                      []  w/ice    []  w/heat    min [] Estim Attended, type/location:                                     []  w/US     []  w/ice    []  w/heat    []  TENS insruct      min []  Mechanical Traction: type/lbs                   []  pro   []  sup   []  int   []  cont    []  before manual    []  after manual    min []  Ultrasound, settings/location:      min []  Iontophoresis w/ dexamethasone, location:                                               []  take home patch       []  in clinic     10   min  unbilled [x]  Ice     []  Heat    location/position: Right shoulder in supine.     min []  Paraffin,  details:     min []  Vasopneumatic Device, press/temp:     min []  Whirlpool / Fluido:    If using vaso (only need to measure limb vaso being performed on)      pre-treatment girth :       post-treatment girth :       measured at (landmark location) :      min []  Other:    Skin assessment post-treatment (if applicable):    []  intact    []  redness- no adverse reaction                 []redness - adverse reaction:         Therapeutic Procedures:  Tx Min Billable or 1:1 Min (if diff from Tx Min)

## 2024-01-08 ENCOUNTER — APPOINTMENT (OUTPATIENT)
Facility: HOSPITAL | Age: 59
End: 2024-01-08
Payer: COMMERCIAL

## 2024-01-10 ENCOUNTER — HOSPITAL ENCOUNTER (OUTPATIENT)
Facility: HOSPITAL | Age: 59
Setting detail: RECURRING SERIES
Discharge: HOME | End: 2024-01-13
Payer: COMMERCIAL

## 2024-01-10 PROCEDURE — 97112 NEUROMUSCULAR REEDUCATION: CPT

## 2024-01-10 PROCEDURE — 97140 MANUAL THERAPY 1/> REGIONS: CPT

## 2024-01-10 PROCEDURE — 97110 THERAPEUTIC EXERCISES: CPT

## 2024-01-15 ENCOUNTER — HOSPITAL ENCOUNTER (OUTPATIENT)
Facility: HOSPITAL | Age: 59
Setting detail: RECURRING SERIES
Discharge: HOME | End: 2024-01-18
Payer: COMMERCIAL

## 2024-01-15 PROCEDURE — 97140 MANUAL THERAPY 1/> REGIONS: CPT

## 2024-01-15 PROCEDURE — 97112 NEUROMUSCULAR REEDUCATION: CPT

## 2024-01-15 PROCEDURE — 97110 THERAPEUTIC EXERCISES: CPT

## 2024-01-15 NOTE — PROGRESS NOTES
PHYSICAL / OCCUPATIONAL THERAPY - DAILY TREATMENT NOTE     Patient Name: Serene Swanson    Date: 1/15/2024    : 1965  Insurance: Payor: DAMARI / Plan: DONNA WETZEL FEDERAL / Product Type: *No Product type* /      Patient  verified Yes     Visit #   Current / Total 5 24   Time   In / Out 152 234   Pain   In / Out 1 0.5   Subjective Functional Status/Changes: Pt reports her right shoulder is not feeling to bad today.   Changes to:  Allergies, Med Hx, Sx Hx?   no       TREATMENT AREA =  Pain in right shoulder [M25.511]    OBJECTIVE    Therapeutic Procedures:  Tx Min Billable or 1:1 Min (if diff from Tx Min) Procedure, Rationale, Specifics   19  05287 Therapeutic Exercise (timed):  increase ROM, strength, coordination, balance, and proprioception to improve patient's ability to progress to PLOF and address remaining functional goals. (see flow sheet as applicable)    Details if applicable:       15  15908 Neuromuscular Re-Education (timed):  improve balance, coordination, kinesthetic sense, posture, core stability and proprioception to improve patient's ability to develop conscious control of individual muscles and awareness of position of extremities in order to progress to PLOF and address remaining functional goals. (see flow sheet as applicable)    Details if applicable:     8  75157 Manual Therapy (timed):  decrease pain, increase ROM, and increase tissue extensibility to improve patient's ability to progress to PLOF and address remaining functional goals.  The manual therapy interventions were performed at a separate and distinct time from the therapeutic activities interventions . Details:        Details if applicable: IASTM/DTM to the lateral right shoulder in supine           Details if applicable:            Details if applicable:     42  [facility] Totals Reminder: bill using total billable min of TIMED therapeutic procedures (example: do not include dry needle or estim unattended, both untimed codes, in

## 2024-01-18 ENCOUNTER — HOSPITAL ENCOUNTER (OUTPATIENT)
Facility: HOSPITAL | Age: 59
Discharge: HOME | End: 2024-01-18
Attending: ORTHOPAEDIC SURGERY
Payer: COMMERCIAL

## 2024-01-18 DIAGNOSIS — M25.552 LEFT HIP PAIN: ICD-10-CM

## 2024-01-18 PROCEDURE — 73721 MRI JNT OF LWR EXTRE W/O DYE: CPT

## 2024-01-22 ENCOUNTER — HOSPITAL ENCOUNTER (OUTPATIENT)
Facility: HOSPITAL | Age: 59
Setting detail: RECURRING SERIES
Discharge: HOME | End: 2024-01-25
Payer: COMMERCIAL

## 2024-01-22 PROCEDURE — 97110 THERAPEUTIC EXERCISES: CPT

## 2024-01-22 PROCEDURE — 97530 THERAPEUTIC ACTIVITIES: CPT

## 2024-01-24 NOTE — PROGRESS NOTES
[facility]  [address] [facility] [address]  [place] [address] [phone] fax    Physical Therapy Discharge Summary  Patient name: Serene Swanson Start of Care: 23   Referral source: John Tim MD : 1965   Medical/Treatment Diagnosis: Pain in right shoulder [M25.511]  Payor: [facility] / Plan: DONNA [facility] FEDERAL / Product Type: *No Product type* /  Onset Date:21     Prior Hospitalization: see medical history Provider#: [id number]   Medications: Verified on Patient Summary List    Comorbidities: Migraines, HTN, Seasonal allergies.     Prior Level of Function:functionally independent, no AD. Left hand dominant.       Visits from Start of Care: 6    Missed Visits: 2    Reporting Period : 23 to 24    Goals/Measure of Progress:  Short Term Goals: To be accomplished in 6 weeks:  Patient will be independent and compliant with HEP to progress toward goals and restore functional mobility.   Eval Status: issued at [facility] (Access Code [email or web address])  1/3/24: Reports compliance.      Patient will improve pain in right shoulder to 3/10  to improve knitting tolerance and restore prior level of function.  Eval Status: 4/10 at worst  24: 2/10 at worst. Met.     Long Term Goals: To be accomplished in 12 weeks:  Patient will improve FOTO score by 65 points to improve functional tolerance for pulling and lifting tasks.  Eval Status: FOTO 63  24: Unable to complete due to no network.      Pt will have 5/5 right shoulder strength to improve joint stability and mechanics while lifting and reaching activities.  Eval Status: 4/5 to 4+/5  24: Grossly 5/5.      Patient will improve pain in right shoulder to <2/10 at worst to improve lifting and pulling tasks tolerance and restore prior level of function.  Eval Status: 4/10 at worst  24: 2/10 at worst. Met.    Assessment/ Summary of Care: the patient showed good improvement in strength, flexibility,

## 2024-01-26 ENCOUNTER — OFFICE VISIT (OUTPATIENT)
Age: 59
End: 2024-01-26
Payer: COMMERCIAL

## 2024-01-26 DIAGNOSIS — M70.62 TROCHANTERIC BURSITIS, LEFT HIP: ICD-10-CM

## 2024-01-26 DIAGNOSIS — M25.552 LEFT HIP PAIN: ICD-10-CM

## 2024-01-26 PROCEDURE — 20611 DRAIN/INJ JOINT/BURSA W/US: CPT | Performed by: ORTHOPAEDIC SURGERY

## 2024-01-26 PROCEDURE — 99213 OFFICE O/P EST LOW 20 MIN: CPT | Performed by: ORTHOPAEDIC SURGERY

## 2024-01-26 RX ORDER — LIDOCAINE HYDROCHLORIDE 10 MG/ML
9 INJECTION, SOLUTION INFILTRATION; PERINEURAL ONCE
Status: COMPLETED | OUTPATIENT
Start: 2024-01-26 | End: 2024-02-14

## 2024-01-26 RX ORDER — TRIAMCINOLONE ACETONIDE 40 MG/ML
40 INJECTION, SUSPENSION INTRA-ARTICULAR; INTRAMUSCULAR ONCE
Status: COMPLETED | OUTPATIENT
Start: 2024-01-26 | End: 2024-02-14

## 2024-01-26 NOTE — PROGRESS NOTES
Scribed by Marcelle Aquino MA as dictated by John Tim MD.  Documentation, performed by, True and Accepted John Tim MD

## 2024-01-31 ENCOUNTER — HOSPITAL ENCOUNTER (OUTPATIENT)
Facility: HOSPITAL | Age: 59
Setting detail: RECURRING SERIES
Discharge: HOME | End: 2024-02-03
Payer: COMMERCIAL

## 2024-01-31 PROCEDURE — 97161 PT EVAL LOW COMPLEX 20 MIN: CPT

## 2024-01-31 PROCEDURE — 97535 SELF CARE MNGMENT TRAINING: CPT

## 2024-01-31 NOTE — PROGRESS NOTES
PT DAILY TREATMENT NOTE/Hip/Knee/Ankle EVAL 10-18      Patient Name: Serene Swanson    Date: 2024    : 1965  Insurance: Payor: DAMARI / Plan: DONNA WETZEL FEDERAL / Product Type: *No Product type* /      Patient  verified yes     Visit #   Current / Total 1 24   Time   In / Out 1:50 2:25   Pain   In / Out 0 0   Subjective Functional Status/Changes: Left hip pain   Changes to:  Meds, Allergies, Med Hx, Sx Hx?  If yes, update Summary List no       TREATMENT AREA =  Pain in left hip [M25.552]        SUBJECTIVE  Pain Level (0-10 scale): 0/10  []constant []intermittent []improving []worsening [x]no change since onset    Any medication changes, allergies to medications, adverse drug reactions, diagnosis change, or new procedure performed?: [x] No    [] Yes (see summary sheet for update)  Subjective functional status/changes:     PLOF: functionally independent, no AD.  Mechanism of Injury: Pt reports she has had hip pain since July. Pain gets worse with prolong sitting and feels better with movement. Pt had x-ray and MRI of hip and results showed bursitis, arthritis and inflammation of hamstrings.   Current symptoms/Complaints: 0/10 at best with movement; 3/10 at worst with prolong sitting; pt reports they are able to sleep through the night with no pain.   Previous Treatment/Compliance: Movement, changing position.   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   Living Situation: Lives with mother in one story house.   Pt Goals: \"strengthen the muscles and prevent recurrence.\"  Barriers: [x]pain []financial []time []transportation []other  Motivation: asking questions, trying to perform skills, and interest   Substance use: []Alcohol []Tobacco []other:   Cognition: A & O x 3        OBJECTIVE    20 min [x]Eval - untimed                        Therapeutic Procedures:  Tx Min Billable or 1:1 Min (if diff from Tx Min) Procedure, Rationale, Specifics   15  52199 Self

## 2024-01-31 NOTE — PROGRESS NOTES
[facility]  [address] [place] [address] Ph:[phone] Fx: [phone]    PLAN OF CARE/ Statement of Necessity for Physical Therapy Services           Patient name: Serene Swanson Start of Care: 2024   Referral source: John Tim MD : 1965    Medical Diagnosis: Pain in left hip [M25.552]       Onset Date: 2023   Treatment Diagnosis: M25.552  LEFT HIP PAIN                                      Prior Hospitalization: see medical history Provider#: [id number]   Medications: Verified on Patient Summary List     Comorbidities: High cholesterol, Hypertension   Prior Level of Function: functionally independent, no AD.     The Plan of Care and following information is based on the information from the initial evaluation.    Assessment / key information:  The patient is a 58-year-old female referred to therapy with left hip bursitis pain. The patient reported a current pain level of 0/10 which gets worsen to 3/10 with prolong sitting . The patient reports pain started in July with no history of injury or fall. The patient reports recent x-ray and MRI of hip showed bursitis, arthritis, and inflammation of hamstrings. During the objective assessment, the patient demonstrated normal AROM with no discomfort. Additionally, the patient showed positive tenderness of left greater trochanter and ITB, stiffness, and weakness in left hip, resulting in functional limitations. Skilled therapy is recommended to address the above deficits and help the patient return to her prior level of function.     Evaluation Complexity:  History:  LOW Complexity : Zero comorbidities / personal factors that will impact the outcome / POC; Examination:  LOW Complexity : 1-2 Standardized tests and measures addressing body structure, function, activity limitation and / or participation in recreation  ;Presentation:  LOW Complexity : Stable, uncomplicated

## 2024-02-05 ENCOUNTER — HOSPITAL ENCOUNTER (OUTPATIENT)
Facility: HOSPITAL | Age: 59
Setting detail: RECURRING SERIES
Discharge: HOME | End: 2024-02-08
Payer: COMMERCIAL

## 2024-02-05 PROCEDURE — 97112 NEUROMUSCULAR REEDUCATION: CPT

## 2024-02-05 PROCEDURE — 97110 THERAPEUTIC EXERCISES: CPT

## 2024-02-05 PROCEDURE — 97530 THERAPEUTIC ACTIVITIES: CPT

## 2024-02-08 ENCOUNTER — APPOINTMENT (OUTPATIENT)
Facility: HOSPITAL | Age: 59
End: 2024-02-08
Payer: COMMERCIAL

## 2024-02-12 ENCOUNTER — HOSPITAL ENCOUNTER (OUTPATIENT)
Facility: HOSPITAL | Age: 59
Setting detail: RECURRING SERIES
Discharge: HOME | End: 2024-02-15
Payer: COMMERCIAL

## 2024-02-12 PROCEDURE — 97112 NEUROMUSCULAR REEDUCATION: CPT

## 2024-02-12 PROCEDURE — 97110 THERAPEUTIC EXERCISES: CPT

## 2024-02-12 NOTE — PROGRESS NOTES
initiating hip strengthening exercises using green thera band as resistance to improve muscle activation. Pt tolerated the session with report of no pain.     Patient will continue to benefit from skilled PT / OT services to modify and progress therapeutic interventions, analyze and address functional mobility deficits, analyze and address ROM deficits, analyze and address strength deficits, analyze and address soft tissue restrictions, analyze and cue for proper movement patterns, analyze and modify for postural abnormalities, and instruct in home and community integration to address functional deficits and attain remaining goals.    Progress toward goals / Updated goals:  []  See Progress Note/Recertification    Short Term Goals: To be accomplished in 4 weeks:  Patient will be independent and compliant with HEP to progress toward goals and restore functional mobility.   Eval Status: issued at eval   Current: Not yet initiated per patient report. 02/05/2024  Patient will improve pain in left hip to <3/10  to improve sitting tolerance and restore prior level of function.  Eval Status: 3/10 at worst     Long Term Goals: To be accomplished in 12 weeks:     1.   Pt will have no tenderness of left greater trochanter and IT band during palpation.   Eval Status: TTP on left GT and ITB     3.   Pt will have 5/5 left hip strength to return to improve joint stability during single leg stance.  Eval Status: 4+/5 to 5-/5     4.   Patient will improve pain in left hip to 1/10 at worst to improve sitting tolerance and restore prior level of function.  Eval Status: 3/10 at worst    PLAN  Yes  Continue plan of care  []  Upgrade activities as tolerated  []  Discharge due to :  []  Other:    Abrahan Larios PT    2/12/2024    1:54 PM    Future Appointments   Date Time Provider Department Center   2/16/2024  1:50 [facility]   2/19/2024  1:10 PM [facility]   2/22/2024  1:10 PM

## 2024-02-14 RX ADMIN — TRIAMCINOLONE ACETONIDE 40 MG: 40 INJECTION, SUSPENSION INTRA-ARTICULAR; INTRAMUSCULAR at 12:50

## 2024-02-14 RX ADMIN — LIDOCAINE HYDROCHLORIDE 9 ML: 10 INJECTION, SOLUTION INFILTRATION; PERINEURAL at 12:49

## 2024-02-16 ENCOUNTER — APPOINTMENT (OUTPATIENT)
Facility: HOSPITAL | Age: 59
End: 2024-02-16
Payer: COMMERCIAL

## 2024-02-22 ENCOUNTER — APPOINTMENT (OUTPATIENT)
Facility: HOSPITAL | Age: 59
End: 2024-02-22
Payer: COMMERCIAL

## 2024-02-26 ENCOUNTER — HOSPITAL ENCOUNTER (OUTPATIENT)
Facility: HOSPITAL | Age: 59
Setting detail: RECURRING SERIES
Discharge: HOME | End: 2024-02-29
Payer: COMMERCIAL

## 2024-02-26 PROCEDURE — 97530 THERAPEUTIC ACTIVITIES: CPT

## 2024-02-26 PROCEDURE — 97110 THERAPEUTIC EXERCISES: CPT

## 2024-02-26 NOTE — PROGRESS NOTES
[facility]  [address]  [place] [address]  [phone] fax    Physical Therapy Discharge Summary  Patient name: Serene Swanson Start of Care: 24   Referral source: John Tim MD : 1965   Medical/Treatment Diagnosis: Pain in left hip [M25.552]  Payor: [employer] / Plan: DONNA WETZEL FEDERAL / Product Type: *No Product type* /  Onset Date:23     Prior Hospitalization: see medical history Provider#: [id number]   Medications: Verified on Patient Summary List    Comorbidities: High cholesterol, Hypertension    Prior Level of Function:functionally independent, no AD.      Visits from Start of Care: 4    Missed Visits: 2    Reporting Period : 24 to 24    Goals/Measure of Progress:  Short Term Goals: To be accomplished in 4 weeks:  Patient will be independent and compliant with HEP to progress toward goals and restore functional mobility.   Eval Status: issued at [facility]   Current: Not yet initiated per patient report. 2024: Met. Reports compliant.   Patient will improve pain in left hip to <3/10  to improve sitting tolerance and restore prior level of function.  Eval Status: 3/10 at worst  24: Met. 2/10 at worst.      Long Term Goals: To be accomplished in 12 weeks:     1.   Pt will have no tenderness of left greater trochanter and IT band during palpation.   Eval Status: TTP on left GT and ITB  24: No TTP.      3.   Pt will have 5/5 left hip strength to return to improve joint stability during single leg stance.  Eval Status: 4+/5 to 5-/5  24: Partially met. Abd 5-/5. Flex/add 5/5     4.   Patient will improve pain in left hip to 1/10 at worst to improve sitting tolerance and restore prior level of function.  Eval Status: 3/10 at worst  24: 2/10 at worst.     Assessment/ Summary of Care: The patient has completed a total 4 visits for left hip and showed good improvement in strength, flexibility,

## 2024-02-26 NOTE — PROGRESS NOTES
PHYSICAL / OCCUPATIONAL THERAPY - DAILY TREATMENT NOTE     Patient Name: Serene Swanson    Date: 2024    : 1965  Insurance: Payor: DAMARI / Plan: DONNA WETZEL FEDERAL / Product Type: *No Product type* /      Patient  verified Yes     Visit #   Current / Total 4 24   Time   In / Out 1:10 1:50   Pain   In / Out 0 0   Subjective Functional Status/Changes: Pt reports she had 5 days of bad headache and had to cancel last two appointments with therapy.    Changes to:  Allergies, Med Hx, Sx Hx?   no       TREATMENT AREA =  Pain in left hip [M25.552]    OBJECTIVE    Therapeutic Procedures:  Tx Min Billable or 1:1 Min (if diff from Tx Min) Procedure, Rationale, Specifics   25  53390 Therapeutic Exercise (timed):  increase ROM, strength, coordination, balance, and proprioception to improve patient's ability to progress to PLOF and address remaining functional goals. (see flow sheet as applicable)    Details if applicable:       15  29549 Therapeutic Activity (timed):  use of dynamic activities replicating functional movements to increase ROM, strength, coordination, balance, and proprioception in order to improve patient's ability to progress to PLOF and address remaining functional goals.  (see flow sheet as applicable)    Details if applicable:            Details if applicable:           Details if applicable:            Details if applicable:     40  [facility] Totals Reminder: bill using total billable min of TIMED therapeutic procedures (example: do not include dry needle or estim unattended, both untimed codes, in totals to left)  8-22 min = 1 unit; 23-37 min = 2 units; 38-52 min = 3 units; 53-67 min = 4 units; 68-82 min = 5 units   Total Total     TOTAL TREATMENT TIME:        40     [x]  Patient Education billed concurrently with other procedures   [x] Review HEP    [] Progressed/Changed HEP, detail:    [] Other detail:       Objective Information/Functional Measures/Assessment    The patient has completed a